# Patient Record
(demographics unavailable — no encounter records)

---

## 2017-01-11 NOTE — ER DOCUMENT REPORT
ED Medical Screen (RME)





- General


Stated Complaint: LEFT EYE PAIN


Notes: 


29 yo male c/o left eye pain since yesterday.  + 's flash.  + tearing.  + 

photosensitivity.  noncontact wearer.  no sig PMHx.


TRAVEL OUTSIDE OF THE [place] IN LAST 30 DAYS: No





- Related Data


Allergies/Adverse Reactions: 


 





No Known Allergies Allergy (Verified 01/11/17 15:30)


 











Past Medical History


Musculoskeltal Medical History: Reports Hx Musculoskeletal Deformity, Reports 

Hx Musculoskeletal Trauma


Psychiatric Medical History: Reports: Hx Anxiety, Hx Attention Deficit 

Hyperactivity Disorder, Hx Bipolar Disorder, Hx Depression


Traumatic Medical History: Reports: Hx Fractures - coccyx


Past Surgical History: Reports: Hx Orthopedic Surgery





- Immunizations


Immunizations up to date: Yes


Hx Diphtheria, Pertussis, Tetanus Vaccination: Yes





Physical Exam





- Vital signs


Vitals: 





 











Temp Pulse Resp BP Pulse Ox


 


 98.0 F   108 H  16   113/68   98 


 


 01/11/17 15:28  01/11/17 15:28  01/11/17 15:28  01/11/17 15:28  01/11/17 15:28














Course





- Vital Signs


Vital signs: 





 











Temp Pulse Resp BP Pulse Ox


 


 98.0 F   108 H  16   113/68   98 


 


 01/11/17 15:28  01/11/17 15:28  01/11/17 15:28  01/11/17 15:28  01/11/17 15:28

## 2017-01-11 NOTE — ER DOCUMENT REPORT
ED Eye Complaint





- General


Chief Complaint: Eye Injury


Stated Complaint: LEFT EYE PAIN


Notes: 


This is a 28-year-old male that presents today with left eye pain.  He states 

that yesterday around 1500 in the afternoon he was helping a friend weld car.  

He states that he looked at the moment his friend started to weld only with his 

left eye.  He admits to light sensitivity, burning, and itching.  Constant 6 

out of 10 pain.  He denies foreign body sensation.  He is not a contact lens 

wearer.  He admits to blurred vision in the left eye.


TRAVEL OUTSIDE OF THE [place] IN LAST 30 DAYS: No





- Related Data


Allergies/Adverse Reactions: 


 





hydrocodone Allergy (Verified 01/11/17 15:35)


 


rosina hips Allergy (Verified 01/11/17 15:35)


 


Bee Sting Allergy (Uncoded 01/11/17 15:35)


 











Past Medical History





- General


Information source: Patient





- Social History


Smoking Status: Current Every Day Smoker


Chew tobacco use (# tins/day): No


Frequency of alcohol use: None


Drug Abuse: Marijuana


Family History: Reviewed & Not Pertinent


Patient has suicidal ideation: No


Patient has homicidal ideation: No


Renal/ Medical History: Denies: Hx Peritoneal Dialysis


Musculoskeltal Medical History: Reports Hx Musculoskeletal Deformity, Reports 

Hx Musculoskeletal Trauma


Psychiatric Medical History: Reports: Hx Anxiety, Hx Attention Deficit 

Hyperactivity Disorder, Hx Bipolar Disorder, Hx Depression


Traumatic Medical History: Reports: Hx Fractures - coccyx


Past Surgical History: Reports: Hx Orthopedic Surgery





- Immunizations


Immunizations up to date: Yes


Hx Diphtheria, Pertussis, Tetanus Vaccination: Yes





Review of Systems





- Review of Systems


Constitutional: denies: Chills, Fever


EENT: See HPI, Blurred vision


Cardiovascular: No symptoms reported


Respiratory: No symptoms reported


Gastrointestinal: No symptoms reported


Genitourinary: No symptoms reported


Musculoskeletal: No symptoms reported


Skin: No symptoms reported


Hematologic/Lymphatic: No symptoms reported


Neurological/Psychological: No symptoms reported





Physical Exam





- Vital signs


Vitals: 


 











Temp Pulse Resp BP Pulse Ox


 


 98.0 F   108 H  16   113/68   98 


 


 01/11/17 15:28  01/11/17 15:28  01/11/17 15:28  01/11/17 15:28  01/11/17 15:28














- General


General appearance: Appears well


In distress: None





- HEENT


Head: Normocephalic, Atraumatic


Visual acuity- Right eye: 20/40


Visual acuity- Left eye: 20/100


Visual acuity- Both eyes: 20/50


Corrective lenses worn: No





- Respiratory


Respiratory status: No respiratory distress


Chest status: Nontender


Breath sounds: Normal.  No: Rales, Rhonchi, Stridor, Wheezing





- Cardiovascular


Rhythm: Regular


Heart sounds: Normal auscultation





- Abdominal


Bowel sounds: Normal


Tenderness: Nontender





- Extremities


General upper extremity: Normal inspection


General lower extremity: Normal inspection





- Neurological


Cognition: Normal.  No: Confused





- Psychological


Associated symptoms: Normal affect, Normal mood





- Skin


Skin Temperature: Warm


Skin Moisture: Dry


Skin Color: Normal





Course





- Re-evaluation


Re-evalutation: 


01/11/17 18:26


Wood's lamp was done on the left eye.  No corneal patient or ulcer was seen.  

No foreign bodies seen on exam.  Patient was advised to follow-up with 

ophthalmologist.  He stated that he would.





- Vital Signs


Vital signs: 


 











Temp Pulse Resp BP Pulse Ox


 


 98.2 F   64   16   84/45 L  95 


 


 01/11/17 18:52  01/11/17 18:52  01/11/17 15:28  01/11/17 18:52  01/11/17 18:52














Discharge





- Discharge


Clinical Impression: 


 Pain, eye, left





Condition: Good


Disposition: HOME, SELF-CARE


Additional Instructions: 


Return to the emergency department if symptoms worsen.  Follow-up with primary 

care physician as soon as possible.


Prescriptions: 


Besifloxacin HCl [Besivance 0.6% Oph Susp 5 ml] 1 drop OP TID #1 bottle


Referrals: 


[facility] [Provider Group] - Follow up as needed

## 2017-05-11 NOTE — ER DOCUMENT REPORT
ED Headache





- General


Mode of Arrival: Ambulatory


Information source: Patient


TRAVEL OUTSIDE OF THE [place] IN LAST 30 DAYS: No





- HPI


Patient complains to provider of: Headache


Associated symptoms: Other - See above





<JODY THOMAS - Last Filed: 05/11/17 12:24>





<BILLY NAVAS - Last Filed: 05/11/17 21:32>





- General


Chief Complaint: Headache


Stated Complaint: HEADACHE


Time Seen by Provider: 05/11/17 10:41


Notes: 


Patient is a 28 year old male, with a past medical history including anxiety, 

who presents to the emergency department complaining of a headache onset 3 

weeks ago. Patient reports that the pain is located in the back of his head and 

sometimes moves to behind his eyes, the headaches generally start in the 

morning and wll subsided for a time in the afternoon before coming back before 

bed. Patient states he has had similar headaches in the past but they were due 

to trauma. Patient reports that marijuana and Ibuprofen help with the pain. 

Patient also complains of numbness in his right middle fingertip. Patient 

recently started taking Adderall 25mg 2 weeks ago but reports he has taken it 

in the past and had no side effects.  (JODY THOMAS)





- Related Data


Allergies/Adverse Reactions: 


 





hydrocodone Allergy (Verified 05/11/17 10:37)


 


rosina hips Allergy (Verified 05/11/17 10:37)


 


Bee Sting Allergy (Uncoded 05/11/17 10:37)


 











Past Medical History





- General


Information source: Patient





- Social History


Smoking Status: Current Every Day Smoker


Chew tobacco use (# tins/day): No


Frequency of alcohol use: None


Drug Abuse: Marijuana


Family History: Reviewed & Not Pertinent


Patient has suicidal ideation: No


Patient has homicidal ideation: No


Renal/ Medical History: Denies: Hx Peritoneal Dialysis


Musculoskeltal Medical History: Reports Hx Musculoskeletal Deformity, Reports 

Hx Musculoskeletal Trauma


Psychiatric Medical History: Reports: Hx Anxiety, Hx Attention Deficit 

Hyperactivity Disorder, Hx Bipolar Disorder, Hx Depression


Traumatic Medical History: Reports: Hx Fractures - coccyx


Past Surgical History: Reports: Hx Orthopedic Surgery





- Immunizations


Immunizations up to date: Yes


Hx Diphtheria, Pertussis, Tetanus Vaccination: Yes





<JODY THOMAS - Last Filed: 05/11/17 12:24>





Review of Systems





- Review of Systems


Constitutional: No symptoms reported


EENT: No symptoms reported


Cardiovascular: No symptoms reported


Respiratory: No symptoms reported


Gastrointestinal: No symptoms reported


Genitourinary: No symptoms reported


Male Genitourinary: No symptoms reported


Musculoskeletal: No symptoms reported


Skin: No symptoms reported


Hematologic/Lymphatic: No symptoms reported


Neurological/Psychological: See HPI, Headaches, Numbness


-: Yes All other systems reviewed and negative





<JODY THOMAS - Last Filed: 05/11/17 12:24>





Physical Exam





- Vital signs


Interpretation: Normal





- HEENT


Head: Normocephalic, Atraumatic


Eyes: Normal


Extraocular movements intact: Yes





- Respiratory


Respiratory status: No respiratory distress


Chest status: Nontender


Breath sounds: Normal


Chest palpation: Normal





- Cardiovascular


Rhythm: Regular


Heart sounds: Normal auscultation





- Back


Back: Normal





- Extremities


General upper extremity: Other - Splint on right 5th finger


General lower extremity: Normal inspection





- Neurological


Neuro grossly intact: Yes


Cognition: Normal


Orientation: AAOx4


[place] Coma Scale Eye Opening: Spontaneous


[place] Coma Scale Verbal: Oriented


[place] Coma Scale Motor: Obeys Commands


[place] Coma Scale Total: 15


Speech: Normal


Cranial nerves: Normal


Cerebellar coordination: Normal


Motor strength normal: LUE, RUE, LLE, RLE


Additional motor exam normals: Equal 


Knee - Reflex grade: 2 = Normal





<JODY THOMAS - Last Filed: 05/11/17 12:24>





- Abdominal


Inspection: Normal


Distension: No distension





- Skin


Skin Temperature: Warm


Skin Moisture: Dry


Skin Color: Normal





<BILLY NAVAS - Last Filed: 05/11/17 21:32>





- Vital signs


Vitals: 


 











Temp Pulse BP Pulse Ox


 


 97.8 F   106 H  118/98 H  97 


 


 05/11/17 10:13  05/11/17 10:13  05/11/17 10:13  05/11/17 10:13














Course





<JODY THOMAS - Last Filed: 05/11/17 12:24>





<BILLY NAVAS - Last Filed: 05/11/17 21:32>





- Re-evaluation


Re-evalutation: 





05/11/17 10:53


Classic presentation of cluster headaches in this 28-year-old male who has a 

headache on setting on the same side of his head going to behind his right eye 

every day at the same time and then resolving with sleep.  No abnormal 

neurologic symptoms at this time that can be linked to the headache, no change 

in vision since the presentation of the headache, no head injury, the reported 

numbness to the distal tip of his third digit on his right hand is spurious and 

does not correlate with any known headache pattern.





Patient was offered injections of Toradol, Compazine and Benadryl to treat this 

headache, states that he does not want any shots whatsoever.  Patient will be 

treated with pills, does not wish to wait to see if they are effective.  

Patient will return for fevers, worsening headache, new neurologic symptoms, 

difficulty speaking or walking and return for stiff neck as well. (BILLY NAVAS)





- Vital Signs


Vital signs: 


 











Temp Pulse Resp BP Pulse Ox


 


 97.8 F   106 H     118/98 H  97 


 


 05/11/17 10:13  05/11/17 10:13     05/11/17 10:13  05/11/17 10:13














Discharge





<JODY THOMAS - Last Filed: 05/11/17 12:24>





<BILLY NAVAS - Last Filed: 05/11/17 21:32>





- Discharge


Clinical Impression: 


 Cluster headache, intractable





Condition: Stable


Disposition: HOME, SELF-CARE


Instructions:  Cluster Headache ([facility])


Additional Instructions: 


Please return for worsening headache, any fever, stiff neck, difficulty speaking

, or any difficulty moving your arms or your leg.


Scribe Attestation: 





05/11/17 21:32


I personally performed the services described in the documentation, reviewed 

and edited the documentation which was dictated to the scribe in my presence, 

and it accurately records my words and actions. (BILLY NAVAS)





Scribe Documentation





- Scribe


Written by José:: josé Gudino, 5/11/17, 1238


acting as scribe for :: Walker





<JODY THOMAS - Last Filed: 05/11/17 12:24>

## 2017-06-21 NOTE — ER DOCUMENT REPORT
ED Medical Screen (RME)





- General


Chief Complaint: Abdominal Pain


Stated Complaint: ABDOMINAL PAIN


Time Seen by Provider: 06/21/17 15:12


Mode of Arrival: Ambulatory


Information source: Patient


Notes: 


This is a 28-year-old man with a history of schizophrenia, bipolar affective 

disorder who presents to the emergency room with left CVA tenderness and left 

lower quadrant tenderness since last night.  The patient does state that he has 

had pink colored urine.  He denies any fever, chills


TRAVEL OUTSIDE OF THE [place] IN LAST 30 DAYS: No





- HPI


Onset: Just prior to arrival


Onset/Duration: Sudden


Quality of pain: Dull


Severity: Moderate


Pain Level: 4


Associated Symptoms: Nausea.  denies: Chills, Fever, Shortness of breath, Sinus 

pain/drainage


Exacerbated by: Denies


Relieved by: Denies


Similar symptoms previously: No


Recently seen / treated by doctor: No





- Related Data


Smoking: Non-smoker


Frequency of alcohol use: None


Drug Abuse: None


Allergies/Adverse Reactions: 


 





hydrocodone Allergy (Verified 06/21/17 14:59)


 


rosina hips Allergy (Verified 06/21/17 14:59)


 


Bee Sting Allergy (Uncoded 06/21/17 14:59)


 











Past Medical History





- General


Information source: Patient





- Social History


Cigarette use (# per day): No


Chew tobacco use (# tins/day): No


Frequency of alcohol use: None


Drug Abuse: None


Lives with: Family


Family history: Reviewed & Not Pertinent





- Medical History


Medical History: Negative


Renal/ Medical History: Denies: Hx Peritoneal Dialysis


Musculoskeltal Medical History: Reports Hx Musculoskeletal Deformity, Reports 

Hx Musculoskeletal Trauma


Psychiatric Medical History: Reports: Hx Anxiety, Hx Attention Deficit 

Hyperactivity Disorder, Hx Bipolar Disorder, Hx Depression


Traumatic Medical History: Reports: Hx Fractures - coccyx


Past Surgical History: Reports: Hx Orthopedic Surgery





- Immunizations


Immunizations up to date: Yes


Hx Diphtheria, Pertussis, Tetanus Vaccination: Yes





Review of Systems





- Review of Systems


Constitutional: denies: Chills, Fever


EENT: No symptoms reported


Cardiovascular: No symptoms reported


Respiratory: No symptoms reported


Gastrointestinal: No symptoms reported


Genitourinary: No symptoms reported


Male Genitourinary: No symptoms reported


Musculoskeletal: No symptoms reported


Skin: No symptoms reported


Hematologic/Lymphatic: No symptoms reported


Neurological/Psychological: No symptoms reported





Physical Exam





- Vital signs


Vitals: 


 











Temp Pulse Resp BP Pulse Ox


 


 98.0 F   76   14   107/71   98 


 


 06/21/17 15:00  06/21/17 15:00  06/21/17 15:00  06/21/17 15:00  06/21/17 15:00











Notes: 


Physical exam:


 


GENERAL:-year-old man, alert and oriented 3, chills and vomiting and appears 

in distress.


HEAD: Atraumatic, normocephalic.


EYES: Pupils equal round and reactive to light, extraocular movements intact, 

sclera anicteric, conjunctiva are normal.


ENT: TMs normal, nares patent, oropharynx clear without exudates.  Moist mucous 

membranes.


NECK: Normal range of motion, supple without lymphadenopathy or JVD.


LUNGS: Breath sounds clear to auscultation bilaterally and equal.  No wheezes 

rales or rhonchi.


HEART: Regular rate and rhythm without murmurs, rubs or gallops.


ABDOMEN: Soft, normoactive bowel sounds.  CVA tenderness. no guarding, no 

rebound.  No masses appreciated.


EXTREMITIES: Normal range of motion, no pitting or edema.  No clubbing or 

cyanosis.


NEUROLOGICAL: Cranial nerves II through XII grossly intact.  Normal speech, 

normal gait.


PSYCH: Normal mood, normal affect.


SKIN: Warm, Dry, normal turgor, no rashes or lesions noted.





Course





- Vital Signs


Vital signs: 


 











Temp Pulse Resp BP Pulse Ox


 


 98.0 F   76   14   107/71   98 


 


 06/21/17 15:00  06/21/17 15:00  06/21/17 15:00  06/21/17 15:00  06/21/17 15:00














- Laboratory


Laboratory results interpreted by me: 


 











  06/21/17





  16:36


 


Urine Protein  100 H


 


Urine Ketones  TRACE H


 


Urine Blood  LARGE H


 


Urine Nitrite  POSITIVE H














- Diagnostic Test


Radiology reviewed: Image reviewed, Reports reviewed - The abdomen shows a left 

mid ureter.  0.2 mm kidney stone.  There is mild hydronephrosis and mild 

hydroureter





Doctor's Discharge





- Discharge


Clinical Impression: 


 Left ureter stone





Condition: Stable


Disposition: HOME, SELF-CARE


Instructions:  Kidney Stone ([facility])


Additional Instructions: 


Recommendations:


Rest, drink plenty of fluids.


Pain medicine as prescribed


Nausea medicine as needed


To the emergency room for worsening pain, fever (temperature greater than 100.5

) or concerns or getting worse.


These stones may take 1-2 weeks for complete passage.





Prescriptions: 


Oxycodone HCl/Acetaminophen [Percocet 5-325 mg Tablet] 1 - 2 tab PO ASDIR PRN #

25 tablet


 PRN Reason: 


Promethazine HCl [Phenergan 25 mg Tablet] 25 mg PO Q6H PRN #15 tablet


 PRN Reason: 


Referrals: 


ABNER ALVARES MD [[facility]] - Follow up as needed (This is the number 

for the urologist who deals with kidney stones.  He is affiliated with this 

[facility].)

## 2017-06-21 NOTE — RADIOLOGY REPORT (SQ)
EXAM DESCRIPTION:  CT LTD RENAL STONE PROTOCOL ON



COMPLETED DATE/TIME:  6/21/2017 3:38 pm



REASON FOR STUDY:  left flank pain



COMPARISON:  None.



TECHNIQUE:  CT scan of the abdomen and pelvis performed without intravenous or oral contrast. Images 
reviewed with lung, soft tissue, and bone windows. Reconstructed coronal and sagittal MPR images revi
ewed. All images stored on PACS.

All CT scanners at this facility use dose modulation, iterative reconstruction, and/or weight based d
osing when appropriate to reduce radiation dose to as low as reasonably achievable (ALARA).

[facility]: Dose Right  [facility]: CareDose    [facility]: Dose Right    [facility]: Teradose 4D    [facility]: Smart RenÃ©Sim



RADIATION DOSE:  Up-to-date CT equipment and radiation dose reduction techniques were employed. CTDIv
ol: 4.8 mGy. DLP: 259 mGy-cm.mGy.



LIMITATIONS:  None.



FINDINGS:  LOWER CHEST: No significant findings. No nodules or infiltrates.

NON-CONTRASTED LIVER, SPLEEN, ADRENALS: Evaluation limited by lack of IV contrast. No identified sign
ificant masses.

PANCREAS: No masses. No peripancreatic inflammatory changes.

GALLBLADDER: No identified stones by CT criteria. No inflammatory changes to suggest cholecystitis.

RIGHT KIDNEY AND URETER: No suspicious masses. Assessment limited by lack of IV contrast.   No signif
icant calcifications.   No hydronephrosis or hydroureter.

LEFT KIDNEY AND URETER: No suspicious masses. Assessment limited by lack of IV contrast.   There is a
 4.2 mm proximal and mid left ureteral stone.   There is mild left-sided hydronephrosis.

AORTA AND RETROPERITONEUM: No aneurysm. No retroperitoneal masses or adenopathy.

BOWEL AND PERITONEAL CAVITY: No obvious masses or inflammatory changes. No free fluid.

APPENDIX: Normal.

PELVIS, BLADDER, AND ABDOMINAL WALL:No abnormal masses. No free fluid. Bladder normal.

BONES: No significant findings.

OTHER: No other significant finding.



IMPRESSION:  4.2 mm proximal to mid left ureteral stone with mild left-sided hydronephrosis.



TECHNICAL DOCUMENTATION:  JOB ID:  [id number]

Quality ID # [id number]: Final reports with documentation of one or more dose reduction techniques (e.g., Au
tomated exposure control, adjustment of the mA and/or kV according to patient size, use of iterative 
reconstruction technique)

 2011 [employer]- All Rights Reserved

## 2017-07-09 NOTE — ER DOCUMENT REPORT
ED Medical Screen (RME)





- General


Chief Complaint: Possible Kidney Stone


Stated Complaint: FLANK PAIN


Time Seen by Provider: 07/09/17 17:20


Notes: 


28-year-old male recent diagnosis of a 4.2 mm stone presents with complaints of 

sudden pain, patient notes it is difficult to urinate.  Denies any fevers or 

chills nausea vomiting or diarrhea








I have greeted and performed a rapid initial assessment of this patient.  A 

comprehensive ED assessment and evaluation of the patient, analysis of test 

results and completion of the medical decision making process will be conducted 

by additional ED providers.





PHYSICAL EXAMINATION:





GENERAL: Well-appearing, well-nourished and in moderate distress





HEAD: Atraumatic, normocephalic.





EYES: Pupils equal round extraocular movements intact,  conjunctiva are normal.





ENT: Nares patent





NECK: Normal range of motion





LUNGS: No respiratory distress





Musculoskeletal: Normal range of motion





NEUROLOGICAL:  Normal speech, normal gait. 





PSYCH: Normal mood, normal affect.





SKIN: Warm, Dry, normal turgor, no rashes or lesions noted.


TRAVEL OUTSIDE OF THE [place] IN LAST 30 DAYS: No





- Related Data


Allergies/Adverse Reactions: 


 





hydrocodone Allergy (Verified 06/21/17 14:59)


 


rosina hips Allergy (Verified 06/21/17 14:59)


 


Bee Sting Allergy (Uncoded 06/21/17 14:59)


 











Past Medical History





- Social History


Family history: Reviewed & Not Pertinent


Renal/ Medical History: Denies: Hx Peritoneal Dialysis


Musculoskeltal Medical History: Reports Hx Musculoskeletal Deformity, Reports 

Hx Musculoskeletal Trauma


Psychiatric Medical History: Reports: Hx Anxiety, Hx Attention Deficit 

Hyperactivity Disorder, Hx Bipolar Disorder, Hx Depression


Traumatic Medical History: Reports: Hx Fractures - coccyx


Past Surgical History: Reports: Hx Orthopedic Surgery





- Immunizations


Immunizations up to date: Yes


Hx Diphtheria, Pertussis, Tetanus Vaccination: Yes





Physical Exam





- Vital signs


Vitals: 





 











Temp Pulse Resp BP Pulse Ox


 


 98 F   105 H  20   119/76   97 


 


 07/09/17 17:10  07/09/17 17:10  07/09/17 17:10  07/09/17 17:10  07/09/17 17:10














Course





- Vital Signs


Vital signs: 





 











Temp Pulse Resp BP Pulse Ox


 


 98 F   105 H  20   119/76   97 


 


 07/09/17 17:10  07/09/17 17:10  07/09/17 17:10  07/09/17 17:10  07/09/17 17:10

## 2017-07-09 NOTE — ER DOCUMENT REPORT
ED GI/





- General


Chief Complaint: Possible Kidney Stone


Stated Complaint: FLANK PAIN


Time Seen by Provider: 07/09/17 17:20


Mode of Arrival: Ambulatory


Information source: Patient


Notes: 


Is a 28-year-old man with a history of a 4.2 mm ureter stone that presents to 

the emergency room pain this morning.  The patient states it was down in the 

left lower quadrant radiating to the penis.  He feels it is quite low at this 

time.  He last urinated shortly before coming to the ER.  Any fever, chills, 

nausea or vomiting.


TRAVEL OUTSIDE OF THE [place] IN LAST 30 DAYS: No





- HPI


Patient complains to provider of: Abdominal pain


Onset: Just prior to arrival


Timing/Duration: Sudden


Quality of pain: No pain


Pain Level: Denies


Location: [facility]Q


Sexual history: Active


Associated symptoms: None


Exacerbated by: Denies


Relieved by: Denies


Similar symptoms previously: Yes


Recently seen / treated by doctor: No





- Related Data


Allergies/Adverse Reactions: 


 





hydrocodone Allergy (Verified 06/21/17 14:59)


 


rosina hips Allergy (Verified 06/21/17 14:59)


 


Bee Sting Allergy (Uncoded 06/21/17 14:59)


 











Past Medical History





- General


Information source: Patient





- Social History


Smoking Status: Unknown if Ever Smoked


Cigarette use (# per day): Yes


Chew tobacco use (# tins/day): No - Pack per day


Frequency of alcohol use: None


Drug Abuse: None


Lives with: Family


Family History: Reviewed & Not Pertinent


Patient has suicidal ideation: No


Patient has homicidal ideation: No





- Medical History


Medical History: Negative


Renal/ Medical History: Denies: Hx Peritoneal Dialysis


Musculoskeltal Medical History: Reports Hx Musculoskeletal Deformity, Reports 

Hx Musculoskeletal Trauma


Psychiatric Medical History: Reports: Hx Anxiety, Hx Attention Deficit 

Hyperactivity Disorder, Hx Bipolar Disorder, Hx Depression


Traumatic Medical History: Reports: Hx Fractures - coccyx


Past Surgical History: Reports: Hx Orthopedic Surgery





- Immunizations


Immunizations up to date: Yes


Hx Diphtheria, Pertussis, Tetanus Vaccination: Yes





Review of Systems





- Review of Systems


Constitutional: denies: Chills, Fever


EENT: No symptoms reported


Cardiovascular: No symptoms reported


Respiratory: No symptoms reported


Gastrointestinal: See HPI


Genitourinary: No symptoms reported


Male Genitourinary: No symptoms reported


Musculoskeletal: No symptoms reported


Skin: No symptoms reported


Hematologic/Lymphatic: No symptoms reported


Neurological/Psychological: No symptoms reported





Physical Exam





- Vital signs


Vitals: 


 











Temp Pulse Resp BP Pulse Ox


 


 98 F   105 H  20   119/76   97 


 


 07/09/17 17:10  07/09/17 17:10  07/09/17 17:10  07/09/17 17:10  07/09/17 17:10











Notes: 


Physical exam:


 


GENERAL:-year-old man, alert and oriented 3, no acute distress


HEAD: Atraumatic, normocephalic.


EYES: Pupils equal round and reactive to light, extraocular movements intact, 

sclera anicteric, conjunctiva are normal.


ENT:  Moist mucous membranes.


NECK: Normal range of motion, supple without lymphadenopathy or JVD.


LUNGS: Breath sounds clear to auscultation bilaterally and equal.  No wheezes 

rales or rhonchi.


HEART: Regular rate and rhythm without murmurs, rubs or gallops.


ABDOMEN: Soft, normoactive bowel sounds.  No tenderness to palpation.  No 

guarding, no rebound.  No masses appreciated.


EXTREMITIES: Normal range of motion, no pitting or edema.  No clubbing or 

cyanosis.


NEUROLOGICAL: Cranial nerves II through XII grossly intact.  Normal speech, 

normal gait.


PSYCH: Normal mood, normal affect.


SKIN: Warm, Dry, normal turgor, no rashes or lesions noted.








Ultrasound: No hydronephrosis.  The bladder is empty at this time.  It is 

difficult to visualize whether there is a bladder stone.





Course





- Re-evaluation


Re-evalutation: 





07/09/17 18:34


Note: I did offer the patient IV fluids with pain medicine.  He said he is 

deathly afraid of needles and does not want them.  He states that he will 

continue to drink p.o. fluids.  I have had a long conversation with him about 

that.  He is able to urinate and I do not see any evidence of an obstructive 

uropathy, so we will try to manage this patient as an outpatient.  A 4 mm stone 

I discussed with him should pass without any intervention.  I have given him 

follow-up number for urologist and [facility]





- Vital Signs


Vital signs: 


 











Temp Pulse Resp BP Pulse Ox


 


 98 F   105 H  20   119/76   97 


 


 07/09/17 17:10  07/09/17 17:10  07/09/17 17:10  07/09/17 17:10  07/09/17 17:10














Discharge





- Discharge


Clinical Impression: 


 Kidney stone





Condition: Stable


Disposition: HOME, SELF-CARE


Additional Instructions: 


As we discussed:





Take ibuprofen 400 mg every 6 hours


Take the Percocet as needed for pain.


Your to drink plenty of fluids.


You can have coffee: Make sure you drink plenty of water before hand.


The ER if you are unable to urinate or if you feel you are having worsening 

pain.








The pain medicine you're taking prescribed as a narcotic.  There are several 

important things you should know about this medicine:





1.  This medicine contains Tylenol: It is important that you do not take 

Tylenol (or acetaminophen) while on this medicine.  


Tylenol is metabolized by the liver and taking too much Tylenol (acetaminophen) 

can lay to liver damage and even liver failure.





2.  Taking narcotics for too long can lead to physical and mental dependence.  

Take this medicine only if really needed and in the lowest quantity to achieve 

pain relief.





3.  Do not drink alcohol while on this medicine.  Alcohol interacts with 

narcotics and the combination can be dangerous.





4.  Do not drive or operate machinery while on this medicine.





5.  Narcotics do cause constipation, so drink plenty of fluids and daily stool 

softeners.











It is recommended that you follow-up with a urologist:


 


[facility]


[place] [facility]


[address]


[place], [place]


[phone]


 


[place] [facility]


[address]


[place], [place]


[phone] 


 





Prescriptions: 


Oxycodone HCl/Acetaminophen [Percocet 5-325 mg Tablet] 1 - 2 tab PO ASDIR PRN #

25 tablet


 PRN Reason: 


Referrals: 


[facility] [Provider Group] - Follow up as needed

## 2017-07-26 NOTE — ER DOCUMENT REPORT
ED General





- General


Chief Complaint: Flank Pain


Stated Complaint: FLANK PAIN


Time Seen by Provider: 07/26/17 14:29


Information source: Patient


Notes: 


Patient states he has a history of kidney stones.  He states he started 

yesterday with left flank pain that radiates into his testicle.  It is severe 

and sharp.  It is constant.  It does feel like previous kidney stone pain.  

Some nausea no vomiting or diarrhea.  No rashes or trauma.


TRAVEL OUTSIDE OF THE [place] IN LAST 30 DAYS: No





- Related Data


Allergies/Adverse Reactions: 


 





hydrocodone Allergy (Verified 07/26/17 14:12)


 


rosina hips Allergy (Verified 07/26/17 14:12)


 


Bee Sting Allergy (Uncoded 07/26/17 14:12)


 








Home Medications: 


 Current Home Medications





Cariprazine Hydrochloride [Vraylar] 3 mg PO DAILY 07/26/17 [History]


Quetiapine Fumarate [Seroquel] 300 mg PO DAILY 07/26/17 [History]











Past Medical History





- General


Information source: Patient





- Social History


Smoking Status: Current Every Day Smoker


Frequency of alcohol use: Occasional


Drug Abuse: None


Family History: Reviewed & Not Pertinent


Renal/ Medical History: Denies: Hx Peritoneal Dialysis


Musculoskeltal Medical History: Reports Hx Musculoskeletal Deformity, Reports 

Hx Musculoskeletal Trauma


Psychiatric Medical History: Reports: Hx Anxiety, Hx Attention Deficit 

Hyperactivity Disorder, Hx Bipolar Disorder, Hx Depression


Traumatic Medical History: Reports: Hx Fractures - coccyx


Past Surgical History: Reports: Hx Orthopedic Surgery





- Immunizations


Immunizations up to date: Yes


Hx Diphtheria, Pertussis, Tetanus Vaccination: Yes





Review of Systems





- Review of Systems


Constitutional: denies: Chills, Fever


Cardiovascular: denies: Chest pain, Palpitations


Respiratory: denies: Cough, Short of breath


Gastrointestinal: Abdominal pain, Nausea


Genitourinary: Burning


-: Yes All other systems reviewed and negative





Physical Exam





- Vital signs


Vitals: 


 











Temp Pulse Resp BP Pulse Ox


 


 98.6 F   86   16   113/67   97 


 


 07/26/17 14:13  07/26/17 14:13  07/26/17 14:13  07/26/17 14:13  07/26/17 14:13











Interpretation: Normal





- General


General appearance: Appears well, Alert





- HEENT


Head: Normocephalic, Atraumatic


Eyes: Normal


Pupils: PERRL





- Respiratory


Respiratory status: No respiratory distress


Chest status: Nontender


Breath sounds: Normal


Chest palpation: Normal





- Cardiovascular


Rhythm: Regular


Heart sounds: Normal auscultation


Murmur: No





- Abdominal


Inspection: Normal


Distension: No distension


Bowel sounds: Normal


Tenderness: Tender - bilat lq tenderness, no rebound or guarding


Organomegaly: No organomegaly





- Back


Back: Normal, Tender - bilat cva tenderness





- Extremities


General upper extremity: Normal inspection, Nontender, Normal color, Normal ROM

, Normal temperature


General lower extremity: Normal inspection, Nontender, Normal color, Normal ROM

, Normal temperature, Normal weight bearing.  No: Mone's sign





- Neurological


Neuro grossly intact: Yes


Cognition: Normal


Orientation: AAOx4


Miguel Coma Scale Eye Opening: Spontaneous


[place] Coma Scale Verbal: Oriented


[place] Coma Scale Motor: Obeys Commands


Miguel Coma Scale Total: 15


Speech: Normal


Motor strength normal: LUE, RUE, LLE, RLE


Sensory: Normal





- Psychological


Associated symptoms: Normal affect, Normal mood





- Skin


Skin Temperature: Warm


Skin Moisture: Dry


Skin Color: Normal





Course





- Re-evaluation


Re-evalutation: 





07/26/17 15:55


pt delcined blood draw. pt understands risks of not being able to diagnose  

renal impairment, liver dz, infection, electrolyte abnormalities, and other 

pathology and understands these could lead to permanent disability or death.





- Vital Signs


Vital signs: 


 











Temp Pulse Resp BP Pulse Ox


 


 98.6 F   86   16   113/67   97 


 


 07/26/17 14:13  07/26/17 14:13  07/26/17 14:13  07/26/17 14:13  07/26/17 14:13














- Laboratory


Laboratory results interpreted by me: 


 











  07/26/17





  15:07


 


Urine Urobilinogen  2.0 H














- Diagnostic Test


Radiology reviewed: Image reviewed, Reports reviewed - no stone or acute process





Discharge





- Discharge


Condition: Stable


Disposition: HOME, SELF-CARE


Instructions:  Flank Pain ([facility])


Additional Instructions: 


No cause for your pain could be found on today's examination.  It is possible 

you have already passed the stone.  If you have any further problems with pain, 

fevers, vomiting or any concerns please return to the emergency department or 

to your primary care provider.


Prescriptions: 


Oxycodone HCl/Acetaminophen [Percocet 5-325 mg Tablet] 1 - 2 tab PO Q4H PRN #6 

tablet


 PRN Reason: 


Referrals: 


ADAMA BRADLEY MD [ACTIVE STAFF] - Follow up as needed

## 2017-07-26 NOTE — RADIOLOGY REPORT (SQ)
EXAM DESCRIPTION:  CT ABD/PELVIS NO ORAL OR IV



COMPLETED DATE/TIME:  7/26/2017 3:28 pm



REASON FOR STUDY:  hx stones, left flank pain



COMPARISON:  6/21/2017.



TECHNIQUE:  CT scan of the abdomen and pelvis performed without intravenous or oral contrast. Images 
reviewed with lung, soft tissue, and bone windows. Reconstructed coronal and sagittal MPR images revi
ewed. All images stored on PACS.

All CT scanners at this facility use dose modulation, iterative reconstruction, and/or weight based d
osing when appropriate to reduce radiation dose to as low as reasonably achievable (ALARA).

[facility]: Dose Right  [facility]: CareDose    [facility]: Dose Right    [facility]: Teradose 4D    [facility]: Smart Fiiiling



RADIATION DOSE:  Up-to-date CT equipment and radiation dose reduction techniques were employed. CTDIv
ol: 4.8 mGy. DLP: 253 mGy-cm.mGy.



LIMITATIONS:  None.



FINDINGS:  LOWER CHEST: No significant findings. No nodules or infiltrates.

NON-CONTRASTED LIVER, SPLEEN, ADRENALS: Evaluation limited by lack of IV contrast. No identified sign
ificant masses.

PANCREAS: No masses. No peripancreatic inflammatory changes.

GALLBLADDER: No identified stones by CT criteria. No inflammatory changes to suggest cholecystitis.

RIGHT KIDNEY AND URETER: No suspicious masses. Assessment limited by lack of IV contrast.   No signif
icant calcifications.   No hydronephrosis or hydroureter.

LEFT KIDNEY AND URETER: No suspicious masses. Assessment limited by lack of IV contrast.   No signifi
cant calcifications.   No hydronephrosis or hydroureter.

AORTA AND RETROPERITONEUM: No aneurysm. No retroperitoneal masses or adenopathy.

BOWEL AND PERITONEAL CAVITY: No obvious masses or inflammatory changes. No free fluid.

APPENDIX: Normal.

PELVIS, BLADDER, AND ABDOMINAL WALL:No abnormal masses. No free fluid. Bladder normal.

BONES: No significant findings.

OTHER: No other significant finding.



IMPRESSION:  NO SIGNIFICANT OR ACUTE PROCESS IN THE ABDOMEN OR PELVIS.  THE PREVIOUSLY SEEN LEFT URET
ERAL CALCULUS IS NO LONGER PRESENT.



TECHNICAL DOCUMENTATION:  JOB ID:  [id number]

Quality ID # [id number]: Final reports with documentation of one or more dose reduction techniques (e.g., Au
tomated exposure control, adjustment of the mA and/or kV according to patient size, use of iterative 
reconstruction technique)

 2011 [employer]- All Rights Reserved

## 2018-01-25 NOTE — RADIOLOGY REPORT (SQ)
EXAM DESCRIPTION:  L SPINE WHOLE



COMPLETED DATE/TIME:  1/25/2018 5:41 pm



REASON FOR STUDY:  low back pain



COMPARISON:  November 2015



NUMBER OF VIEWS:  Five views including obliques.



TECHNIQUE:  AP, lateral, oblique, and sacral radiographic images acquired of the lumbar spine.



LIMITATIONS:  None.



FINDINGS:  MINERALIZATION: Normal.

SEGMENTATION: Normal.  No transitional anatomy.

ALIGNMENT: There is a minimal lumbar scoliosis convex to the left which may be positional in nature.

VERTEBRAE: Maintained height.  No fracture or worrisome bone lesion.

DISCS: Preserved height.  No significant osteophytes or end plate irregularity.

POSTERIOR ELEMENTS: Pedicles and facets are intact.  No pars defect or posterior arch defects.

HARDWARE: None in the spine.

PARASPINAL SOFT TISSUES: Normal.

PELVIS: Intact as visualized. No fractures or worrisome bone lesions. SI joints intact.

OTHER: No other significant finding.



IMPRESSION:  No significant vertebral compression or disc space reduction is seen.  No significant de
generative changes.  Other findings as noted above



TECHNICAL DOCUMENTATION:  JOB ID:  [id number]

 [employer]- All Rights Reserved

## 2018-01-25 NOTE — ER DOCUMENT REPORT
HPI





- HPI


Patient complains to provider of: back pain


Onset: This afternoon


Onset/Duration: Sudden


Quality of pain: Sharp


Pain Level: 5


Context: 





Patient states he was using a saw to cut a tree into multiple smaller segments.

  Patient states that he bent over to pick this saw up and had a sudden sharp 

pain in his lower back.  Patient denies any radiculopathy at this time.  

Patient without any urinary retention or incontinence.  Patient does report a 

history of low back pain in the past but states it is not typically this severe.


Associated Symptoms: Other - Low back pain.  denies: Fever, Headache


Exacerbated by: Movement


Relieved by: Denies


Similar symptoms previously: Yes


Recently seen / treated by doctor: No





- ROS


ROS below otherwise negative: Yes


Systems Reviewed and Negative: Yes All other systems reviewed and negative





- CONSTITUTIONAL


Constitutional: DENIES: Fever, Chills





- NEURO


Neurology: DENIES: Weakness





- REPRODUCTIVE


Reproductive: DENIES: Pregnant:





- MUSCULOSKELETAL


Musculoskeletal: REPORTS: Back Pain.  DENIES: Extremity pain





- DERM


Skin Color: Normal


Skin Problems: None





Past Medical History





- General


Information source: Patient





- Social History


Smoking Status: Never Smoker


Frequency of alcohol use: None


Drug Abuse: Marijuana


Occupation: none


Lives with: Family


Family History: Reviewed & Not Pertinent





- Medical History


Medical History: Other - Leg length discrepancy


Renal/ Medical History: Denies: Hx Peritoneal Dialysis


Musculoskeltal Medical History: Reports Hx Musculoskeletal Deformity, Reports 

Hx Musculoskeletal Trauma


Psychiatric Medical History: Reports: Hx Anxiety, Hx Attention Deficit 

Hyperactivity Disorder, Hx Bipolar Disorder, Hx Depression


Traumatic Medical History: Reports: Hx Fractures - coccyx


Past Surgical History: Reports: Hx Orthopedic Surgery





- Immunizations


Immunizations up to date: Yes


Hx Diphtheria, Pertussis, Tetanus Vaccination: Yes





Vertical Provider Document





- CONSTITUTIONAL


Agree With Documented VS: Yes


Exam Limitations: No Limitations


General Appearance: WD/WN, No Apparent Distress


Notes: 





PHYSICAL EXAMINATION:





GENERAL: Well-appearing and in no acute distress.





HEAD: Atraumatic, normocephalic.





EYES: sclera clear, anicteric, conjunctiva are normal.





ENT: nares patent, Moist mucous membranes.





NECK: Normal range of motion, supple no lymphadenopathy





LUNGS: respirations unlabored





HEART: Regular rate and rhythm without murmurs 





EXTREMITIES: Normal range of motion, no pitting or edema.  No cyanosis. Gait 

normal, pt ambulates without difficulty





BACK: Lower lumbar paraspinal tenderness, lumbar midline tenderness, no 

deformities or step-offs.  No CVA tenderness. 





NEUROLOGICAL: Cranial nerves grossly intact.  Normal speech, normal gait.  No 

saddle anesthesia.  2+ bilateral patellar reflexes, negative straight leg test 

bilaterally





PSYCH: Normal mood, normal affect.





SKIN: Warm, Dry, normal turgor, no rashes or lesions noted.











- INFECTION CONTROL


TRAVEL OUTSIDE OF THE [place] IN LAST 30 DAYS: No





- RESPIRATORY


O2 Sat by Pulse Oximetry: 96





Course





- Re-evaluation


Re-evalutation: 





01/25/18 17:57


The patient presents with low back pain without signs of spinal cord compression

, cauda equina syndrome, infection, aneurysm, or other serious etiology.  The 

patient is neurologically intact.  Given the extremely risk of these diagnoses 

further testing and evaluation for these possibilities does not appear to be 

indicated at this time.  Patient has been instructed to return if the symptoms 

worsen or change in any way.


01/25/18 17:58


Controlled substance database reviewed





- Vital Signs


Vital signs: 


 











Temp Pulse Resp BP Pulse Ox


 


 98.0 F   101 H  18   106/63   96 


 


 01/25/18 16:06  01/25/18 16:06  01/25/18 16:06  01/25/18 16:06  01/25/18 16:06














- Diagnostic Test


Radiology reviewed: Reports reviewed





Discharge





- Discharge


Clinical Impression: 


Low back pain


Qualifiers:


 Chronicity: unspecified Back pain laterality: midline Sciatica presence: 

without sciatica Qualified Code(s): M54.5 - Low back pain





Condition: Stable


Disposition: HOME, SELF-CARE


Instructions:  Ice Packs ([facility]), Low Back Pain ([facility]), Oral Narcotic Medication (

[facility]), Warm Packs ([facility])


Additional Instructions: 


Return immediately for any new or worsening symptoms





Followup with your primary care provider, call tomorrow to make a followup 

appointment





Do not take your pain medication Percocet with your Lorazepam, only take one 

medication or the other


Prescriptions: 


Oxycodone HCl/Acetaminophen [Percocet 5-325 mg Tablet] 1 tab PO ASDIR PRN #12 

tablet


 PRN Reason: 


Referrals: 


[facility] [Provider Group] - Follow up tomorrow

## 2018-05-29 NOTE — ER DOCUMENT REPORT
ED General





- General


Chief Complaint: Chest Pain


Stated Complaint: CHEST PAIN


Time Seen by Provider: 05/29/18 02:49


Notes: 


Patient is a 29-year-old male that comes emergency department for chief 

complaint of pain in his chest and an episode where he felt lightheaded like he 

was going to pass out.  Patient rode to the emergency department with his 

friend to accompany them, he states that after he arrived he got lightheaded, 

walked out to the parking lot, threw up once, felt somewhat better but then 

checked in.  He states sitting down in the bed now he has no symptoms.  He 

admits to falling off a ladder 2 days ago and has pain over his ribs.  He 

denies head injury, headache, abdominal pain.  He states he is medicated with 

Ativan and trazodone for anxiety and insomnia.  He smokes.  He denies alcohol 

or recreational drugs.


TRAVEL OUTSIDE OF THE [place] IN LAST 30 DAYS: No





- Related Data


Allergies/Adverse Reactions: 


 





hydrocodone Allergy (Verified 07/26/17 14:12)


 


rosina hips Allergy (Verified 07/26/17 14:12)


 


Bee Sting Allergy (Uncoded 07/26/17 14:12)


 











Past Medical History





- General


Information source: Patient





- Social History


Smoking Status: Current Every Day Smoker


Frequency of alcohol use: Occasional


Drug Abuse: Marijuana


Lives with: Friend


Family History: Reviewed & Not Pertinent


Renal/ Medical History: Denies: Hx Peritoneal Dialysis


Musculoskeltal Medical History: Reports Hx Musculoskeletal Deformity, Reports 

Hx Musculoskeletal Trauma


Psychiatric Medical History: Reports: Hx Anxiety, Hx Attention Deficit 

Hyperactivity Disorder, Hx Bipolar Disorder, Hx Depression


Traumatic Medical History: Reports: Hx Fractures - coccyx


Past Surgical History: Reports: Hx Orthopedic Surgery





- Immunizations


Immunizations up to date: Yes


Hx Diphtheria, Pertussis, Tetanus Vaccination: Yes





Review of Systems





- Review of Systems


Constitutional: No symptoms reported


EENT: No symptoms reported


Cardiovascular: See HPI


Respiratory: See HPI


Gastrointestinal: No symptoms reported


Genitourinary: No symptoms reported


Male Genitourinary: No symptoms reported


Musculoskeletal: See HPI


Skin: No symptoms reported


Hematologic/Lymphatic: No symptoms reported


Neurological/Psychological: No symptoms reported





Physical Exam





- Vital signs


Vitals: 


 











Temp Pulse Resp BP Pulse Ox


 


 98.4 F   83   16   95/42 L  97 


 


 05/29/18 02:31  05/29/18 02:31  05/29/18 02:31  05/29/18 02:31  05/29/18 02:31














- Notes


Notes: 





GENERAL: Alert, interacts well. No acute distress.


HEAD: Normocephalic, atraumatic.


EYES: Pupils equal, round, and reactive to light. Extraocular movements intact.


ENT: Oral mucosa slightly dry, tongue midline.  Very poor dentition.


NECK: Full range of motion. Supple. Trachea midline.


LUNGS: Clear to auscultation bilaterally, no wheezes, rales, or rhonchi. No 

respiratory distress.  Tenderness with palpation of the general chest wall, 

nonspecific, no crepitus, erythema, or other abnormality noted


HEART: Regular rate and rhythm. No murmur


ABDOMEN: Soft, non-tender. Non-distended. Bowel sounds present in all 4 

quadrants.


EXTREMITIES: Moves all 4 extremities spontaneously. No edema, normal radial and 

dorsalis pedis pulses bilaterally. No cyanosis.


BACK: no cervical, thoracic, lumbar midline tenderness. No saddle anesthesia, 

normal distal neurovascular exam. 


NEUROLOGICAL: Alert and oriented x3. Normal speech. [cranial nerves II through 

XII grossly intact]. 


PSYCH: Patient is talkative, makes eye contact, laughs frequently


SKIN: Warm, dry, normal turgor. No rashes or lesions noted.  Pale








Course





- Re-evaluation


Re-evalutation: 


Patient's initial blood pressure was low, repeat is unremarkable and normal.  

Is alert and talkative, he has chest wall pain generally with no signs of 

ecchymosis, no signs of trauma.  Clear lungs, no tachycardia, normal 

neurological exam.





Because of lightheaded episode and vomiting recommended laboratory workup, but 

patient refused. Recheck of blood pressure is normal. Patient is very thin. EKG 

shows 1 inverted T-wave but no T-wave his inversions in consecutive leads, no 

ST segment changes, unremarkable EKG and otherwise.  Chest x-ray is 

unremarkable.  Urinalysis does not show dehydration, glucose in the urine.  

Patient refuses a Accu-Chek in addition to the blood work he refused. Refuses 

IV for IVF.





On reevaluation patient has no complaints except for the chest wall pain.  He 

is asking to leave.  I explained that I have a low suspicion of ACS, PE, 

dissection, and the remaining of his workup was unremarkable however I suspect 

he had low blood sugar or has low blood sugar although he is not allowing me to 

check.  Patient admits that he eats terribly and rarely, states she will 

improve his eating, follow-up with primary care, and return for any concerning 

symptoms which were discussed.  Provided with muscle relaxer for his chest wall.








- Vital Signs


Vital signs: 


 











Temp Pulse Resp BP Pulse Ox


 


 98.7 F   83   15   98/71 L  98 


 


 05/29/18 03:55  05/29/18 02:31  05/29/18 03:55  05/29/18 03:55  05/29/18 03:55














Discharge





- Discharge


Clinical Impression: 


 Chest pain of uncertain etiology, Chest wall pain





Condition: Stable


Disposition: HOME, SELF-CARE


Additional Instructions: 


Your workup tonight does not show any concerning abnormalities.  Your exam 

shows pain in your chest wall.  Your symptoms earlier are suggestive of low 

blood sugar.  You need to improve your eating habits and eat more frequently.  

Apply heat to your chest wall, rest, hydrate, take the muscle relaxer as 

prescribed if needed.  Follow-up with primary care.  Return for any concerning 

symptoms including difficulty breathing, worsening pain, fever, or any other 

concerning symptoms.


Prescriptions: 


Methocarbamol [Robaxin 500 mg Tablet] 500 mg PO QID PRN #20 tablet


 PRN Reason:

## 2018-05-29 NOTE — RADIOLOGY REPORT (SQ)
EXAM DESCRIPTION: 2 views of the chest.



CLINICAL HISTORY: chest pain



COMPARISON: 11/19/2016



FINDINGS: Frontal and lateral views of the chest.  The

cardiomediastinal silhouette has normal size and contour. No

consolidation, pneumothorax, or pleural effusion.  Dextroconvex

scoliosis of the lumbar spine. Leads overlie the chest.  Upper

abdominal soft tissues are unremarkable.



IMPRESSION:



1. No acute pulmonary process identified.

## 2018-05-29 NOTE — EKG REPORT
SEVERITY:- BORDERLINE ECG -

SINUS RHYTHM

PROBABLE LEFT ATRIAL ABNORMALITY

BORDERLINE RIGHT AXIS DEVIATION

:

Confirmed by: Vik Almanzar 29-May-2018 09:02:22